# Patient Record
Sex: MALE | Race: WHITE | HISPANIC OR LATINO | Employment: FULL TIME | URBAN - METROPOLITAN AREA
[De-identification: names, ages, dates, MRNs, and addresses within clinical notes are randomized per-mention and may not be internally consistent; named-entity substitution may affect disease eponyms.]

---
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
## 2020-09-21 ENCOUNTER — TELEPHONE (OUTPATIENT)
Dept: SCHEDULING | Facility: IMAGING CENTER | Age: 20
End: 2020-09-21

## 2020-09-22 ENCOUNTER — OFFICE VISIT (OUTPATIENT)
Dept: MEDICAL GROUP | Facility: LAB | Age: 20
End: 2020-09-22
Payer: COMMERCIAL

## 2020-09-22 VITALS
OXYGEN SATURATION: 94 % | RESPIRATION RATE: 18 BRPM | TEMPERATURE: 98.4 F | WEIGHT: 174 LBS | HEART RATE: 80 BPM | BODY MASS INDEX: 23.57 KG/M2 | SYSTOLIC BLOOD PRESSURE: 102 MMHG | HEIGHT: 72 IN | DIASTOLIC BLOOD PRESSURE: 60 MMHG

## 2020-09-22 DIAGNOSIS — Z00.00 PREVENTATIVE HEALTH CARE: ICD-10-CM

## 2020-09-22 PROCEDURE — 99385 PREV VISIT NEW AGE 18-39: CPT | Performed by: PHYSICIAN ASSISTANT

## 2020-09-22 ASSESSMENT — ENCOUNTER SYMPTOMS
SORE THROAT: 0
EYES NEGATIVE: 1
GASTROINTESTINAL NEGATIVE: 1
DIAPHORESIS: 0
MUSCULOSKELETAL NEGATIVE: 1
RESPIRATORY NEGATIVE: 1
NEUROLOGICAL NEGATIVE: 1
WEIGHT LOSS: 0
CHILLS: 0
FEVER: 0
PSYCHIATRIC NEGATIVE: 1
CARDIOVASCULAR NEGATIVE: 1

## 2020-09-22 ASSESSMENT — PATIENT HEALTH QUESTIONNAIRE - PHQ9: CLINICAL INTERPRETATION OF PHQ2 SCORE: 0

## 2020-09-22 NOTE — PROGRESS NOTES
Real Porter is a 20 y.o. male new patient here for preventative visit, labs.       HPI:   Preventative health care  New patient today  Doing well  No acute concerns.   Due for Biometric screening for work.     Current medicines (including changes today)  Current Outpatient Medications   Medication Sig Dispense Refill   • Ibuprofen (MOTRIN PO) Take  by mouth as needed.       No current facility-administered medications for this visit.      He  has no past medical history of Allergy, Muscle disorder, or OSTEOPOROSIS.  He  has a past surgical history that includes acl reconstruction scope (11/6/2013); meniscus repair (11/6/2013); acl reconstruction scope (5/22/2014); meniscectomy (5/22/2014); and synovectomy (5/22/2014).  Social History     Tobacco Use   • Smoking status: Never Smoker   • Smokeless tobacco: Never Used   Substance Use Topics   • Alcohol use: No   • Drug use: No     Social History     Social History Narrative    Drive fork lift at work.         Exercises daily for about 60mins.     Well balanced diet.      Family History   Problem Relation Age of Onset   • Diabetes Other    • Hypertension Other      Family Status   Relation Name Status   • Mo  Alive   • Fa  Alive   • OTHER  (Not Specified)   • OTHER  (Not Specified)         ROS  Review of Systems   Constitutional: Negative for chills, diaphoresis, fever, malaise/fatigue and weight loss.   HENT: Negative.  Negative for congestion, hearing loss and sore throat.    Eyes: Negative.    Respiratory: Negative.    Cardiovascular: Negative.    Gastrointestinal: Negative.    Genitourinary: Negative.    Musculoskeletal: Negative.    Skin: Negative for itching and rash.   Neurological: Negative.    Endo/Heme/Allergies: Negative.  Negative for environmental allergies.   Psychiatric/Behavioral: Negative.         Objective:     /60   Pulse 80   Temp 36.9 °C (98.4 °F)   Resp 18   Ht 1.829 m (6')   Wt 78.9 kg (174 lb)   SpO2 94%  Body mass index is 23.6  kg/m².  Physical Exam:    Constitutional: Alert, no distress.  Skin: Warm, dry, good turgor, no rashes in visible areas.  Eye: Equal, round and reactive, conjunctiva clear, lids normal.  ENMT: Lips without lesions, good dentition, oropharynx clear. Neck: Trachea midline, no masses, no thyromegaly. No cervical or supraclavicular lymphadenopathy.  Respiratory: Unlabored respiratory effort, lungs clear to auscultation, no wheezes, no ronchi.  Cardiovascular: Normal S1, S2, no murmur, no edema.  Psych: Alert and oriented x3, normal affect and mood.        Assessment and Plan:   The following treatment plan was discussed    1. Preventative health care  CBC WITH DIFFERENTIAL    Comp Metabolic Panel    Lipid Profile    HEMOGLOBIN A1C    TSH WITH REFLEX TO FT4     Pt is doing well  No acute concerns  Due for labs - ordered today; pt to complete when fasting.   Will complete paperwork once labs have been reviewed.     Records requested.  Followup: Return if symptoms worsen or fail to improve.

## 2020-09-25 ENCOUNTER — TELEPHONE (OUTPATIENT)
Dept: MEDICAL GROUP | Facility: LAB | Age: 20
End: 2020-09-25

## 2020-09-25 LAB
ALBUMIN SERPL-MCNC: 4.9 G/DL (ref 4.1–5.2)
ALBUMIN/GLOB SERPL: 2 {RATIO} (ref 1.2–2.2)
ALP SERPL-CCNC: 89 IU/L (ref 39–117)
ALT SERPL-CCNC: 16 IU/L (ref 0–44)
AST SERPL-CCNC: 17 IU/L (ref 0–40)
BASOPHILS # BLD AUTO: 0 X10E3/UL (ref 0–0.2)
BASOPHILS NFR BLD AUTO: 1 %
BILIRUB SERPL-MCNC: 0.5 MG/DL (ref 0–1.2)
BUN SERPL-MCNC: 24 MG/DL (ref 6–20)
BUN/CREAT SERPL: 20 (ref 9–20)
CALCIUM SERPL-MCNC: 9.6 MG/DL (ref 8.7–10.2)
CHLORIDE SERPL-SCNC: 104 MMOL/L (ref 96–106)
CHOLEST SERPL-MCNC: 122 MG/DL (ref 100–199)
CO2 SERPL-SCNC: 23 MMOL/L (ref 20–29)
CREAT SERPL-MCNC: 1.18 MG/DL (ref 0.76–1.27)
EOSINOPHIL # BLD AUTO: 0.1 X10E3/UL (ref 0–0.4)
EOSINOPHIL NFR BLD AUTO: 1 %
ERYTHROCYTE [DISTWIDTH] IN BLOOD BY AUTOMATED COUNT: 13.4 % (ref 11.6–15.4)
GLOBULIN SER CALC-MCNC: 2.4 G/DL (ref 1.5–4.5)
GLUCOSE SERPL-MCNC: 98 MG/DL (ref 65–99)
HBA1C MFR BLD: 5.8 % (ref 4.8–5.6)
HCT VFR BLD AUTO: 46.3 % (ref 37.5–51)
HDLC SERPL-MCNC: 43 MG/DL
HGB BLD-MCNC: 15.6 G/DL (ref 13–17.7)
IMM GRANULOCYTES # BLD AUTO: 0 X10E3/UL (ref 0–0.1)
IMM GRANULOCYTES NFR BLD AUTO: 0 %
IMMATURE CELLS  115398: NORMAL
LABORATORY COMMENT REPORT: NORMAL
LDLC SERPL CALC-MCNC: 65 MG/DL (ref 0–99)
LYMPHOCYTES # BLD AUTO: 2.3 X10E3/UL (ref 0.7–3.1)
LYMPHOCYTES NFR BLD AUTO: 40 %
MCH RBC QN AUTO: 28.1 PG (ref 26.6–33)
MCHC RBC AUTO-ENTMCNC: 33.7 G/DL (ref 31.5–35.7)
MCV RBC AUTO: 83 FL (ref 79–97)
MONOCYTES # BLD AUTO: 0.4 X10E3/UL (ref 0.1–0.9)
MONOCYTES NFR BLD AUTO: 8 %
MORPHOLOGY BLD-IMP: NORMAL
NEUTROPHILS # BLD AUTO: 2.8 X10E3/UL (ref 1.4–7)
NEUTROPHILS NFR BLD AUTO: 50 %
NRBC BLD AUTO-RTO: NORMAL %
PLATELET # BLD AUTO: 207 X10E3/UL (ref 150–450)
POTASSIUM SERPL-SCNC: 4 MMOL/L (ref 3.5–5.2)
PROT SERPL-MCNC: 7.3 G/DL (ref 6–8.5)
RBC # BLD AUTO: 5.55 X10E6/UL (ref 4.14–5.8)
SODIUM SERPL-SCNC: 142 MMOL/L (ref 134–144)
TRIGL SERPL-MCNC: 69 MG/DL (ref 0–149)
TSH SERPL DL<=0.005 MIU/L-ACNC: 1.86 UIU/ML (ref 0.45–4.5)
VLDLC SERPL CALC-MCNC: 14 MG/DL (ref 5–40)
WBC # BLD AUTO: 5.6 X10E3/UL (ref 3.4–10.8)

## 2020-09-25 NOTE — TELEPHONE ENCOUNTER
1. Caller Name. Real Porter      How would the patient prefer to be contacted with a response:     Pt will pick his forms up from the office. [facility] forms have been scanned into media.

## 2020-09-28 ENCOUNTER — TELEPHONE (OUTPATIENT)
Dept: MEDICAL GROUP | Facility: LAB | Age: 20
End: 2020-09-28

## 2020-09-28 NOTE — TELEPHONE ENCOUNTER
----- Message from Janet Koehler P.A.-C. sent at 9/27/2020  5:33 PM PDT -----  Hi team,     Please let Real know that his labs look good.     Only thing I see is The A1c, which measures your blood sugar over 3 months, is slightly elevated to a prediabetic range - please reduce and monitor your sugar/carbohydrate intake - less bread, pasta, rice, candy, soda/sweets, etc.    Let me know if you have any questions or concerns.   Janet Koehler P.A.-C.

## 2021-05-11 ENCOUNTER — OFFICE VISIT (OUTPATIENT)
Dept: URGENT CARE | Facility: PHYSICIAN GROUP | Age: 21
End: 2021-05-11
Payer: COMMERCIAL

## 2021-05-11 VITALS
SYSTOLIC BLOOD PRESSURE: 112 MMHG | DIASTOLIC BLOOD PRESSURE: 72 MMHG | RESPIRATION RATE: 12 BRPM | BODY MASS INDEX: 23.8 KG/M2 | HEIGHT: 71 IN | WEIGHT: 170 LBS | TEMPERATURE: 98.6 F | OXYGEN SATURATION: 98 % | HEART RATE: 65 BPM

## 2021-05-11 DIAGNOSIS — J30.2 SEASONAL ALLERGIC RHINITIS, UNSPECIFIED TRIGGER: ICD-10-CM

## 2021-05-11 PROCEDURE — 99213 OFFICE O/P EST LOW 20 MIN: CPT | Performed by: NURSE PRACTITIONER

## 2021-05-11 ASSESSMENT — ENCOUNTER SYMPTOMS
SHORTNESS OF BREATH: 0
CARDIOVASCULAR NEGATIVE: 1
RESPIRATORY NEGATIVE: 1
FEVER: 0
CHILLS: 0
CONSTITUTIONAL NEGATIVE: 1
COUGH: 0
SORE THROAT: 0

## 2021-05-11 ASSESSMENT — VISUAL ACUITY: OU: 1

## 2021-05-11 ASSESSMENT — FIBROSIS 4 INDEX: FIB4 SCORE: 0.45

## 2021-05-11 NOTE — PATIENT INSTRUCTIONS
Allergic Rhinitis, Adult  Allergic rhinitis is an allergic reaction that affects the mucous membrane inside the nose. It causes sneezing, a runny or stuffy nose, and the feeling of mucus going down the back of the throat (postnasal drip). Allergic rhinitis can be mild to severe.  There are two types of allergic rhinitis:  · Seasonal. This type is also called hay fever. It happens only during certain seasons.  · Perennial. This type can happen at any time of the year.  What are the causes?  This condition happens when the body's defense system (immune system) responds to certain harmless substances called allergens as though they were germs.   Seasonal allergic rhinitis is triggered by pollen, which can come from grasses, trees, and weeds. Perennial allergic rhinitis may be caused by:  · House dust mites.  · Pet dander.  · Mold spores.  What are the signs or symptoms?  Symptoms of this condition include:  · Sneezing.  · Runny or stuffy nose (nasal congestion).  · Postnasal drip.  · Itchy nose.  · Tearing of the eyes.  · Trouble sleeping.  · Daytime sleepiness.  How is this diagnosed?  This condition may be diagnosed based on:  · Your medical history.  · A physical exam.  · Tests to check for related conditions, such as:  ? Asthma.  ? Pink eye.  ? Ear infection.  ? Upper respiratory infection.  · Tests to find out which allergens trigger your symptoms. These may include skin or blood tests.  How is this treated?  There is no cure for this condition, but treatment can help control symptoms. Treatment may include:  · Taking medicines that block allergy symptoms, such as antihistamines. Medicine may be given as a shot, nasal spray, or pill.  · Avoiding the allergen.  · Desensitization. This treatment involves getting ongoing shots until your body becomes less sensitive to the allergen. This treatment may be done if other treatments do not help.  · If taking medicine and avoiding the allergen does not work, new, stronger  medicines may be prescribed.  Follow these instructions at home:  · Find out what you are allergic to. Common allergens include smoke, dust, and pollen.  · Avoid the things you are allergic to. These are some things you can do to help avoid allergens:  ? Replace carpet with wood, tile, or vinyl toby. Carpet can trap dander and dust.  ? Do not smoke. Do not allow smoking in your home.  ? Change your heating and air conditioning filter at least once a month.  ? During allergy season:  § Keep windows closed as much as possible.  § Plan outdoor activities when pollen counts are lowest. This is usually during the evening hours.  § When coming indoors, change clothing and shower before sitting on furniture or bedding.  · Take over-the-counter and prescription medicines only as told by your health care provider.  · Keep all follow-up visits as told by your health care provider. This is important.  Contact a health care provider if:  · You have a fever.  · You develop a persistent cough.  · You make whistling sounds when you breathe (you wheeze).  · Your symptoms interfere with your normal daily activities.  Get help right away if:  · You have shortness of breath.  Summary  · This condition can be managed by taking medicines as directed and avoiding allergens.  · Contact your health care provider if you develop a persistent cough or fever.  · During allergy season, keep windows closed as much as possible.  This information is not intended to replace advice given to you by your health care provider. Make sure you discuss any questions you have with your health care provider.  Document Released: 09/12/2002 Document Revised: 11/30/2018 Document Reviewed: 01/25/2018  [employer] Patient Education © 2020 [employer]      May use any combination of the following over-the-counter medications per 's instructions:  - sinus rinse kits/nasal saline sprays  - nasal steroid sprays (e.g. Flonase, Nasacort)  - oral daily  antihistamine (e.g. Claritin, Zyrtec, Allegra)

## 2021-05-11 NOTE — LETTER
May 11, 2021         Patient: Real Porter   YOB: 2000   Date of Visit: 5/11/2021           To Whom it May Concern:     Real Porter was seen in my clinic on 5/11/2021. He was excluded from work due to symptoms.    In my medical opinion, patient has symptoms of chronic environmental allergies and not of a communicable disease. He is medically cleared to return to normal work duties.    Please excuse patient from his work absences on 5/7, 5/10, and 5/11.    If you have any questions or concerns, please don't hesitate to call.        Sincerely,         TIA Henriquez.  Electronically Signed

## 2021-05-11 NOTE — PROGRESS NOTES
Subjective:     Real Porter is a 21 y.o. male who presents for Nasal Congestion (congestion, sneezing, watery/itchy eyes, no fever,)       Other  This is a new problem. The problem has been unchanged. Associated symptoms include congestion. Pertinent negatives include no chills, coughing, fever or sore throat. Treatments tried: Nasal sprays. The treatment provided no relief.     Patient reports that for the past 5 days, he had symptoms of runny nose with clear discharge, sneezing, and watery/itchy eyes.  Denies respiratory symptoms.  No cough, shortness of breath, or fever.  Patient reports a history of environmental allergies with similar symptoms.  Denies sick contacts or recent travel.  Reports that he was excluded from work because of his symptoms and comes in to be evaluated and cleared to return to work.    Patient was screened prior to rooming and denied COVID-19 diagnosis or contact with a person who has been diagnosed or is suspected to have COVID-19. During this visit, appropriate PPE was worn, hand hygiene was performed, and the patient and any visitors were masked.     PMH:  has no past medical history of Allergy, Muscle disorder, or OSTEOPOROSIS.    MEDS:   Current Outpatient Medications:   •  Ibuprofen (MOTRIN PO), Take  by mouth as needed., Disp: , Rfl:     ALLERGIES: No Known Allergies    SURGHX:   Past Surgical History:   Procedure Laterality Date   • ACL RECONSTRUCTION SCOPE  5/22/2014    Performed by Marlon Gaytan M.D. at [facility]   • MENISCECTOMY  5/22/2014    Performed by Marlon Gaytan M.D. at [facility]   • SYNOVECTOMY  5/22/2014    Performed by Marlon Gaytan M.D. at [facility]   • ACL RECONSTRUCTION SCOPE  11/6/2013    Performed by Marlon Gaytan M.D. at [facility]   • MENISCUS REPAIR  11/6/2013    Performed by Marlon Gaytan M.D. at [facility]     SOCHX:  reports that he has never smoked. He has never used smokeless tobacco. He  "reports that he does not drink alcohol and does not use drugs.     FH: Reviewed with patient, not pertinent to this visit.    Review of Systems   Constitutional: Negative.  Negative for chills, fever and malaise/fatigue.   HENT: Positive for congestion. Negative for sore throat.         Runny nose, clear discharge, sneezing   Eyes:        Watery, itchy eyes   Respiratory: Negative.  Negative for cough and shortness of breath.    Cardiovascular: Negative.    Endo/Heme/Allergies: Positive for environmental allergies.   All other systems reviewed and are negative.    Additional details per HPI.      Objective:     /72 (BP Location: Right arm, Patient Position: Sitting, BP Cuff Size: Adult)   Pulse 65   Temp 37 °C (98.6 °F) (Temporal)   Resp 12   Ht 1.803 m (5' 11\")   Wt 77.1 kg (170 lb)   SpO2 98%   BMI 23.71 kg/m²     Physical Exam  Vitals reviewed.   Constitutional:       General: He is not in acute distress.     Appearance: He is well-developed. He is not ill-appearing or toxic-appearing.   HENT:      Head: Normocephalic.      Right Ear: External ear normal.      Left Ear: External ear normal.      Nose: Nose normal.      Mouth/Throat:      Mouth: Mucous membranes are moist.      Pharynx: Oropharynx is clear. Uvula midline.   Eyes:      General: Vision grossly intact.         Right eye: No discharge.         Left eye: No discharge.      Extraocular Movements: Extraocular movements intact.      Conjunctiva/sclera: Conjunctivae normal.      Right eye: Right conjunctiva is not injected.      Left eye: Left conjunctiva is not injected.      Pupils: Pupils are equal, round, and reactive to light.   Cardiovascular:      Rate and Rhythm: Normal rate and regular rhythm.      Pulses: Normal pulses.      Heart sounds: Normal heart sounds.   Pulmonary:      Effort: Pulmonary effort is normal. No respiratory distress.      Breath sounds: Normal breath sounds. No decreased breath sounds, wheezing, rhonchi or rales. "   Musculoskeletal:         General: No deformity. Normal range of motion.      Cervical back: Normal range of motion.   Skin:     General: Skin is warm and dry.      Capillary Refill: Capillary refill takes less than 2 seconds.      Coloration: Skin is not pale.   Neurological:      Mental Status: He is alert and oriented to person, place, and time.      Sensory: No sensory deficit.      Motor: No weakness.      Coordination: Coordination normal.   Psychiatric:         Behavior: Behavior normal. Behavior is cooperative.       Assessment/Plan:     1. Seasonal allergic rhinitis, unspecified trigger    May use any combination of the following over-the-counter medications per 's instructions:  - sinus rinse kits/nasal saline sprays  - nasal steroid sprays (e.g. Flonase, Nasacort)  - oral daily antihistamine (e.g. Claritin, Zyrtec, Allegra)    Work note provided.    Differential diagnosis, natural history, supportive care, over-the-counter symptom management per 's instructions, close monitoring, and indications for immediate follow-up discussed.     Patient advised to: Return for 1) Symptoms that worsen/don't improve, or go to ER, 2) Follow up with primary care in 7-10 days.    All questions answered. Patient agrees with the plan of care.    Discharge summary provided.

## 2021-09-30 ENCOUNTER — HOSPITAL ENCOUNTER (OUTPATIENT)
Dept: LAB | Facility: MEDICAL CENTER | Age: 21
End: 2021-09-30
Attending: PHYSICIAN ASSISTANT
Payer: COMMERCIAL

## 2021-09-30 ENCOUNTER — OFFICE VISIT (OUTPATIENT)
Dept: MEDICAL GROUP | Facility: LAB | Age: 21
End: 2021-09-30
Payer: COMMERCIAL

## 2021-09-30 VITALS
DIASTOLIC BLOOD PRESSURE: 78 MMHG | RESPIRATION RATE: 14 BRPM | HEIGHT: 71 IN | OXYGEN SATURATION: 97 % | TEMPERATURE: 98.3 F | SYSTOLIC BLOOD PRESSURE: 110 MMHG | BODY MASS INDEX: 24.95 KG/M2 | WEIGHT: 178.2 LBS | HEART RATE: 55 BPM

## 2021-09-30 DIAGNOSIS — R73.03 PREDIABETES: ICD-10-CM

## 2021-09-30 DIAGNOSIS — Z00.00 PREVENTATIVE HEALTH CARE: Primary | ICD-10-CM

## 2021-09-30 DIAGNOSIS — Z00.00 PREVENTATIVE HEALTH CARE: ICD-10-CM

## 2021-09-30 LAB
ALBUMIN SERPL BCP-MCNC: 4.8 G/DL (ref 3.2–4.9)
ALBUMIN/GLOB SERPL: 1.7 G/DL
ALP SERPL-CCNC: 91 U/L (ref 30–99)
ALT SERPL-CCNC: 27 U/L (ref 2–50)
ANION GAP SERPL CALC-SCNC: 8 MMOL/L (ref 7–16)
AST SERPL-CCNC: 16 U/L (ref 12–45)
BILIRUB SERPL-MCNC: 0.3 MG/DL (ref 0.1–1.5)
BUN SERPL-MCNC: 24 MG/DL (ref 8–22)
CALCIUM SERPL-MCNC: 9.8 MG/DL (ref 8.5–10.5)
CHLORIDE SERPL-SCNC: 105 MMOL/L (ref 96–112)
CHOLEST SERPL-MCNC: 145 MG/DL (ref 100–199)
CO2 SERPL-SCNC: 24 MMOL/L (ref 20–33)
CREAT SERPL-MCNC: 1.1 MG/DL (ref 0.5–1.4)
EST. AVERAGE GLUCOSE BLD GHB EST-MCNC: 123 MG/DL
FASTING STATUS PATIENT QL REPORTED: NORMAL
GLOBULIN SER CALC-MCNC: 2.8 G/DL (ref 1.9–3.5)
GLUCOSE SERPL-MCNC: 100 MG/DL (ref 65–99)
HBA1C MFR BLD: 5.9 % (ref 4–5.6)
HDLC SERPL-MCNC: 45 MG/DL
LDLC SERPL CALC-MCNC: 78 MG/DL
POTASSIUM SERPL-SCNC: 4.2 MMOL/L (ref 3.6–5.5)
PROT SERPL-MCNC: 7.6 G/DL (ref 6–8.2)
SODIUM SERPL-SCNC: 137 MMOL/L (ref 135–145)
TRIGL SERPL-MCNC: 112 MG/DL (ref 0–149)

## 2021-09-30 PROCEDURE — 80053 COMPREHEN METABOLIC PANEL: CPT

## 2021-09-30 PROCEDURE — 83036 HEMOGLOBIN GLYCOSYLATED A1C: CPT

## 2021-09-30 PROCEDURE — 80061 LIPID PANEL: CPT

## 2021-09-30 PROCEDURE — 36415 COLL VENOUS BLD VENIPUNCTURE: CPT

## 2021-09-30 PROCEDURE — 99395 PREV VISIT EST AGE 18-39: CPT | Performed by: PHYSICIAN ASSISTANT

## 2021-09-30 ASSESSMENT — PATIENT HEALTH QUESTIONNAIRE - PHQ9: CLINICAL INTERPRETATION OF PHQ2 SCORE: 0

## 2021-09-30 ASSESSMENT — FIBROSIS 4 INDEX: FIB4 SCORE: 0.45

## 2021-09-30 NOTE — PROGRESS NOTES
Real Porter Jr. is a 21 y.o. male  patient here for preventive exam.     HPI:    Preventative health care  Patient presents today for annual physical.  Doing well.  No acute concerns.  He does have a form that needs to be completed for his insurance requires him to have fasting blood work today.    Current medicines (including changes today)  No current outpatient medications on file.     No current facility-administered medications for this visit.     He  has no past medical history of Allergy, Muscle disorder, or OSTEOPOROSIS.  He  has a past surgical history that includes acl reconstruction scope (11/6/2013); meniscus repair (11/6/2013); acl reconstruction scope (5/22/2014); meniscectomy (5/22/2014); and synovectomy (5/22/2014).  Social History     Tobacco Use   • Smoking status: Never Smoker   • Smokeless tobacco: Never Used   Vaping Use   • Vaping Use: Never used   Substance Use Topics   • Alcohol use: No   • Drug use: No     Social History     Social History Narrative    Drive fork lift at work.         Exercises daily for about 60mins.     Well balanced diet.      Family History   Problem Relation Age of Onset   • Diabetes Other    • Hypertension Other      Family Status   Relation Name Status   • Mo  Alive   • Fa  Alive   • OTHER  (Not Specified)   • OTHER  (Not Specified)         ROS  Constitutional: Negative for fever, chills and malaise/fatigue.   HENT: Negative for congestion.    Eyes: Negative for pain.   Respiratory: Negative for cough and shortness of breath.    Cardiovascular: Negative for leg swelling.   Gastrointestinal: Negative for nausea, vomiting, abdominal pain and diarrhea.   Genitourinary: Negative for dysuria and hematuria.   Skin: Negative for rash.   Neurological: Negative for dizziness, focal weakness and headaches.   Endo/Heme/Allergies: Does not bruise/bleed easily.   Psychiatric/Behavioral: Negative for depression.  The patient is not nervous/anxious.    All other systems  "reviewed and are negative     Objective:     /78 (BP Location: Left arm, Patient Position: Sitting, BP Cuff Size: Adult)   Pulse (!) 55   Temp 36.8 °C (98.3 °F) (Temporal)   Resp 14   Ht 1.803 m (5' 11\")   Wt 80.8 kg (178 lb 3.2 oz)   SpO2 97%  Body mass index is 24.85 kg/m².  Physical Exam:    Constitutional: Alert, no distress.  Skin: Warm, dry, good turgor, no rashes in visible areas.  Eye: Equal, round and reactive, conjunctiva clear, lids normal.  ENMT: Lips without lesions, good dentition, oropharynx clear. TMs pearly gray bilaterally.  Neck: Trachea midline, no masses, no thyromegaly. No cervical or supraclavicular lymphadenopathy.  Respiratory: Unlabored respiratory effort, lungs clear to auscultation, no wheezes, no ronchi.  Cardiovascular: Normal S1, S2, no murmur, no edema.  Psych: Alert and oriented x3, normal affect and mood.        Assessment and Plan:   The following treatment plan was discussed    1. Prediabetes  We advise to reduce sugar/carbohydrate/alcohol, eat more vegetables and lean meats such as fish/chicken/turkey. We also recommend 30 minutes of cardiovascular exercise most days of the week.  - Comp Metabolic Panel; Future  - Lipid Profile; Future  - HEMOGLOBIN A1C; Future    2. Preventative health care  Annual physical completed today.  Exam is within normal limits.  He is fasting today and is able to complete blood work for form is required for insurance/work.  Follow-up in 1 year, of course sooner if needed  - Comp Metabolic Panel; Future  - Lipid Profile; Future  - HEMOGLOBIN A1C; Future    Patient declined vaccinations today.    Followup: Return in about 1 year (around 9/30/2022), or if symptoms worsen or fail to improve.       Janet Koehler P.A.-C.  Supervising MD: Dr. Darryl Tam MD  09/30/21      "

## 2021-09-30 NOTE — ASSESSMENT & PLAN NOTE
Patient presents today for annual physical.  Doing well.  No acute concerns.  He does have a form that needs to be completed for his insurance requires him to have fasting blood work today.

## 2022-09-09 ENCOUNTER — OFFICE VISIT (OUTPATIENT)
Dept: MEDICAL GROUP | Facility: LAB | Age: 22
End: 2022-09-09
Payer: COMMERCIAL

## 2022-09-09 ENCOUNTER — HOSPITAL ENCOUNTER (OUTPATIENT)
Dept: LAB | Facility: MEDICAL CENTER | Age: 22
End: 2022-09-09
Attending: PHYSICIAN ASSISTANT
Payer: COMMERCIAL

## 2022-09-09 VITALS
RESPIRATION RATE: 16 BRPM | DIASTOLIC BLOOD PRESSURE: 76 MMHG | BODY MASS INDEX: 27.58 KG/M2 | SYSTOLIC BLOOD PRESSURE: 102 MMHG | WEIGHT: 197 LBS | OXYGEN SATURATION: 97 % | HEIGHT: 71 IN | HEART RATE: 68 BPM | TEMPERATURE: 97.2 F

## 2022-09-09 DIAGNOSIS — Z00.00 PREVENTATIVE HEALTH CARE: ICD-10-CM

## 2022-09-09 DIAGNOSIS — Z00.00 PREVENTATIVE HEALTH CARE: Primary | ICD-10-CM

## 2022-09-09 LAB
ALBUMIN SERPL BCP-MCNC: 4.7 G/DL (ref 3.2–4.9)
ALBUMIN/GLOB SERPL: 1.7 G/DL
ALP SERPL-CCNC: 90 U/L (ref 30–99)
ALT SERPL-CCNC: 17 U/L (ref 2–50)
ANION GAP SERPL CALC-SCNC: 10 MMOL/L (ref 7–16)
AST SERPL-CCNC: 16 U/L (ref 12–45)
BASOPHILS # BLD AUTO: 0.8 % (ref 0–1.8)
BASOPHILS # BLD: 0.05 K/UL (ref 0–0.12)
BILIRUB SERPL-MCNC: 0.3 MG/DL (ref 0.1–1.5)
BUN SERPL-MCNC: 21 MG/DL (ref 8–22)
CALCIUM SERPL-MCNC: 9.3 MG/DL (ref 8.5–10.5)
CHLORIDE SERPL-SCNC: 105 MMOL/L (ref 96–112)
CHOLEST SERPL-MCNC: 168 MG/DL (ref 100–199)
CO2 SERPL-SCNC: 25 MMOL/L (ref 20–33)
CREAT SERPL-MCNC: 0.98 MG/DL (ref 0.5–1.4)
EOSINOPHIL # BLD AUTO: 0.06 K/UL (ref 0–0.51)
EOSINOPHIL NFR BLD: 0.9 % (ref 0–6.9)
ERYTHROCYTE [DISTWIDTH] IN BLOOD BY AUTOMATED COUNT: 43.2 FL (ref 35.9–50)
EST. AVERAGE GLUCOSE BLD GHB EST-MCNC: 117 MG/DL
GFR SERPLBLD CREATININE-BSD FMLA CKD-EPI: 111 ML/MIN/1.73 M 2
GLOBULIN SER CALC-MCNC: 2.7 G/DL (ref 1.9–3.5)
GLUCOSE SERPL-MCNC: 108 MG/DL (ref 65–99)
HBA1C MFR BLD: 5.7 % (ref 4–5.6)
HCT VFR BLD AUTO: 48.8 % (ref 42–52)
HDLC SERPL-MCNC: 51 MG/DL
HGB BLD-MCNC: 16.5 G/DL (ref 14–18)
IMM GRANULOCYTES # BLD AUTO: 0.03 K/UL (ref 0–0.11)
IMM GRANULOCYTES NFR BLD AUTO: 0.5 % (ref 0–0.9)
LDLC SERPL CALC-MCNC: 99 MG/DL
LYMPHOCYTES # BLD AUTO: 2.26 K/UL (ref 1–4.8)
LYMPHOCYTES NFR BLD: 34.2 % (ref 22–41)
MCH RBC QN AUTO: 28.4 PG (ref 27–33)
MCHC RBC AUTO-ENTMCNC: 33.8 G/DL (ref 33.7–35.3)
MCV RBC AUTO: 84.1 FL (ref 81.4–97.8)
MONOCYTES # BLD AUTO: 0.43 K/UL (ref 0–0.85)
MONOCYTES NFR BLD AUTO: 6.5 % (ref 0–13.4)
NEUTROPHILS # BLD AUTO: 3.78 K/UL (ref 1.82–7.42)
NEUTROPHILS NFR BLD: 57.1 % (ref 44–72)
NRBC # BLD AUTO: 0 K/UL
NRBC BLD-RTO: 0 /100 WBC
PLATELET # BLD AUTO: 186 K/UL (ref 164–446)
PMV BLD AUTO: 11.7 FL (ref 9–12.9)
POTASSIUM SERPL-SCNC: 3.8 MMOL/L (ref 3.6–5.5)
PROT SERPL-MCNC: 7.4 G/DL (ref 6–8.2)
RBC # BLD AUTO: 5.8 M/UL (ref 4.7–6.1)
SODIUM SERPL-SCNC: 140 MMOL/L (ref 135–145)
TRIGL SERPL-MCNC: 91 MG/DL (ref 0–149)
TSH SERPL DL<=0.005 MIU/L-ACNC: 1.35 UIU/ML (ref 0.38–5.33)
WBC # BLD AUTO: 6.6 K/UL (ref 4.8–10.8)

## 2022-09-09 PROCEDURE — 84443 ASSAY THYROID STIM HORMONE: CPT

## 2022-09-09 PROCEDURE — 99395 PREV VISIT EST AGE 18-39: CPT | Performed by: PHYSICIAN ASSISTANT

## 2022-09-09 PROCEDURE — 80061 LIPID PANEL: CPT

## 2022-09-09 PROCEDURE — 36415 COLL VENOUS BLD VENIPUNCTURE: CPT

## 2022-09-09 PROCEDURE — 83036 HEMOGLOBIN GLYCOSYLATED A1C: CPT

## 2022-09-09 PROCEDURE — 80053 COMPREHEN METABOLIC PANEL: CPT

## 2022-09-09 PROCEDURE — 85025 COMPLETE CBC W/AUTO DIFF WBC: CPT

## 2022-09-09 ASSESSMENT — FIBROSIS 4 INDEX: FIB4 SCORE: 0.34

## 2022-09-09 NOTE — PROGRESS NOTES
"Subjective:   CC: Real Porter Jr. is a 22 y.o. male here today for Annual physical     HPI:  Preventative health care  Follow up, doing well.   Requesting labs.      Current medicines (including changes today)  No current outpatient medications on file.     No current facility-administered medications for this visit.     He  has no past medical history of Allergy, Muscle disorder, or OSTEOPOROSIS.    ROS   No chest pain, no shortness of breath, no abdominal pain       Objective:     /76 (BP Location: Left arm, Patient Position: Sitting, BP Cuff Size: Adult)   Pulse 68   Temp 36.2 °C (97.2 °F) (Temporal)   Resp 16   Ht 1.803 m (5' 11\")   Wt 89.4 kg (197 lb)   SpO2 97%  Body mass index is 27.48 kg/m².     Physical Exam:  Constitutional: Alert, no distress.  Skin: Warm, dry, good turgor, no rashes in visible areas.  Eye: Equal, round, conjunctiva clear, lids normal.  Neck: Trachea midline, no masses, no thyromegaly. No cervical or supraclavicular lymphadenopathy  Respiratory: Unlabored respiratory effort, lungs clear to auscultation, no wheezes, no ronchi.  Cardiovascular: Normal S1, S2, no murmur, no edema.  Psych: Alert and oriented x3, normal affect and mood.    Assessment and Plan:   The following treatment plan was discussed    1. Preventative health care  Doing well, no acute concern.   Due for labs.   Has a form for health insurance through his work that will be completed once labs have been reviewed.   - CBC WITH DIFFERENTIAL; Future  - Comp Metabolic Panel; Future  - Lipid Profile; Future  - HEMOGLOBIN A1C; Future  - TSH WITH REFLEX TO FT4; Future      Followup: Return in about 1 year (around 9/9/2023), or if symptoms worsen or fail to improve.       Janet Koehler P.A.-C.  Supervising MD: Dr. Darryl Tam MD  09/09/22      "

## 2023-07-26 ENCOUNTER — OFFICE VISIT (OUTPATIENT)
Dept: URGENT CARE | Facility: CLINIC | Age: 23
End: 2023-07-26
Payer: COMMERCIAL

## 2023-07-26 VITALS
WEIGHT: 195 LBS | OXYGEN SATURATION: 98 % | DIASTOLIC BLOOD PRESSURE: 60 MMHG | HEIGHT: 71 IN | SYSTOLIC BLOOD PRESSURE: 110 MMHG | TEMPERATURE: 97.8 F | HEART RATE: 82 BPM | RESPIRATION RATE: 18 BRPM | BODY MASS INDEX: 27.3 KG/M2

## 2023-07-26 DIAGNOSIS — R11.2 NAUSEA AND VOMITING, UNSPECIFIED VOMITING TYPE: ICD-10-CM

## 2023-07-26 DIAGNOSIS — S61.217A LACERATION OF LEFT LITTLE FINGER WITHOUT FOREIGN BODY WITHOUT DAMAGE TO NAIL, INITIAL ENCOUNTER: ICD-10-CM

## 2023-07-26 PROCEDURE — 3074F SYST BP LT 130 MM HG: CPT | Performed by: NURSE PRACTITIONER

## 2023-07-26 PROCEDURE — 12002 RPR S/N/AX/GEN/TRNK2.6-7.5CM: CPT | Performed by: NURSE PRACTITIONER

## 2023-07-26 PROCEDURE — 3078F DIAST BP <80 MM HG: CPT | Performed by: NURSE PRACTITIONER

## 2023-07-26 RX ORDER — ONDANSETRON 4 MG/1
4 TABLET, ORALLY DISINTEGRATING ORAL ONCE
Status: COMPLETED | OUTPATIENT
Start: 2023-07-26 | End: 2023-07-26

## 2023-07-26 RX ADMIN — ONDANSETRON 4 MG: 4 TABLET, ORALLY DISINTEGRATING ORAL at 14:18

## 2023-07-26 ASSESSMENT — FIBROSIS 4 INDEX: FIB4 SCORE: 0.5

## 2023-07-26 NOTE — PROGRESS NOTES
Patient has consented to treatment and for use of patient information for treatment and billing purposes.    Date: 07/26/23     Arrival Mode: Private Vehicle    Chief Complaint:    Chief Complaint   Patient presents with    Laceration     While he was trying to cut foot x today         History of Present Illness: 23 y.o.  male presents to clinic with a laceration to his left pinky that he obtained while using a .  This happened approximately 30 minutes ago.  Denies any numbness or tingling or decreased range of motion.  He  denies that he was forceful with the cutting or any type of crush injury.      ROS:    As stated in HPI     Pertinent Medical History:  History reviewed. No pertinent past medical history.     Pertinent Surgical History:  Past Surgical History:   Procedure Laterality Date    RECONSTRUCTION, KNEE, ACL, ARTHROSCOPIC  5/22/2014    Performed by Marlon Gaytan M.D. at [facility]    MENISCECTOMY  5/22/2014    Performed by Marlon Gaytan M.D. at [facility]    SYNOVECTOMY  5/22/2014    Performed by Marlon Gaytan M.D. at [facility]    RECONSTRUCTION, KNEE, ACL, ARTHROSCOPIC  11/6/2013    Performed by Marlon Gaytan M.D. at [facility]    MENISCUS REPAIR  11/6/2013    Performed by Marlon Gaytan M.D. at [facility]        Pertinent Medications:    No current outpatient medications on file prior to visit.     No current facility-administered medications on file prior to visit.        Allergies:    Patient has no known allergies.     Social History:  Social History     Tobacco Use    Smoking status: Never    Smokeless tobacco: Never   Vaping Use    Vaping Use: Never used   Substance Use Topics    Alcohol use: No    Drug use: No        No LMP for male patient.           Physical Exam:    Vitals:    07/26/23 1230   BP: 110/60   Pulse: 82   Resp: 18   Temp: 36.6 °C (97.8 °F)   SpO2: 98%             Physical Exam  Constitutional:        Appearance: Normal appearance.   Musculoskeletal:        Hands:       Comments: Cap refill intact less than 3 seconds.  Range of motion intact flexor and extensor tendons intact.  No underlying bony tenderness noted.   Neurological:      Mental Status: He is alert.          LACERATION PROCEDURE NOTE:    Procedure explained and verbal consent obtained. Digital block t  The wound was anesthetized with  5 mL of  1% lidocaine, with good anesthesia. Sterile drape and prep were done. Copious irrigation was done with saline and the wound was explored. Length of wound was visualized at approximately: total 7cm . There was no visualized foreign body or deep structure injury noted. Wound was examined under range of motion, range of motion intact. Wound edges were approximated with good alignment using sutures. 15 sutures were placed using 5-0.  Nonstick dressing applied. Patient tolerated procedure well with no complications.     Medical Decision making and clinic course :  I personally reviewed prior external notes and test results pertinent to today's visit. Pt is clinically stable at today's acute urgent care visit.  No acute distress noted. Appropriate for outpatient care at this time. Shared decision-making was utilized with patient for treatment plan. UTD on Tdap     Pleasant nontoxic-appearing 23-year-old male presenting clinic with an acute laceration of his left fifth finger.  Fortunately exam findings reassuring flexor and extensor tendons intact no suspicion of bony trauma.  Patient did become wheezy and did begin vomiting prior to laceration closure.  Patient was given Zofran.  Patient was then laid down on the exam table with feet elevated.  This is the position he remained in during laceration closure and he did tolerate well.  Discussed appropriate wound care techniques as well as signs and symptoms of infection and when to return to clinic.  No indication of prophylactic antibiotics at this time.        The  patient remained stable during the urgent care visit.    Plan:    Medication discussed included indication for use and the potential  benefits and side effects.     1. Laceration of left little finger without foreign body without damage to nail, initial encounter      2. Nausea and vomiting, unspecified vomiting type    - ondansetron (Zofran ODT) dispertab 4 mg      All of the patient's questions were answered to their satisfaction at the time of discharge.    Follow up:    Recommended f/u in  7- 10 days  for removal     Patient was encouraged to monitor symptoms closely. Those signs and symptoms which would warrant concern and mandate seeking a higher level of service through the emergency department discussed at length and included in discharge papers.  Patient stated agreement and understanding of this plan of care.    Disposition:  Home in stable condition       Voice Recognition Disclaimer:  Portions of this document were created using voice recognition software. The software does have a chance of producing errors of grammar and possibly content. I have made every reasonable attempt to correct obvious errors, but there may be errors of grammar and possibly content that I did not discover before finalizing the documentation.    Shanta Mendoza, [email or web address].

## 2023-07-26 NOTE — LETTER
July 26, 2023    To Whom It May Concern:         This is confirmation that Real Porter Jr. attended his scheduled appointment with BRENDAN Agrawal on 7/26/23. Please excuse from work 7/27/23. May return 7/28//23        Sincerely,          TIA Agrawal.  [phone]

## 2023-08-05 ENCOUNTER — OFFICE VISIT (OUTPATIENT)
Dept: URGENT CARE | Facility: CLINIC | Age: 23
End: 2023-08-05
Payer: COMMERCIAL

## 2023-08-05 VITALS
RESPIRATION RATE: 16 BRPM | HEART RATE: 95 BPM | OXYGEN SATURATION: 97 % | SYSTOLIC BLOOD PRESSURE: 130 MMHG | BODY MASS INDEX: 27.3 KG/M2 | WEIGHT: 195 LBS | HEIGHT: 71 IN | TEMPERATURE: 96.6 F | DIASTOLIC BLOOD PRESSURE: 80 MMHG

## 2023-08-05 DIAGNOSIS — Z48.02 ENCOUNTER FOR REMOVAL OF SUTURES: ICD-10-CM

## 2023-08-05 PROCEDURE — 3075F SYST BP GE 130 - 139MM HG: CPT | Performed by: PHYSICIAN ASSISTANT

## 2023-08-05 PROCEDURE — 99212 OFFICE O/P EST SF 10 MIN: CPT | Mod: 25 | Performed by: PHYSICIAN ASSISTANT

## 2023-08-05 PROCEDURE — 15853 REMOVAL SUTR/STAPL XREQ ANES: CPT | Performed by: PHYSICIAN ASSISTANT

## 2023-08-05 PROCEDURE — 3079F DIAST BP 80-89 MM HG: CPT | Performed by: PHYSICIAN ASSISTANT

## 2023-08-05 ASSESSMENT — ENCOUNTER SYMPTOMS
SORE THROAT: 0
MYALGIAS: 0
CONSTIPATION: 0
NAUSEA: 0
ABDOMINAL PAIN: 0
CHILLS: 0
EYE PAIN: 0
DIARRHEA: 0
FEVER: 0
HEADACHES: 0
SHORTNESS OF BREATH: 0
COUGH: 0
VOMITING: 0

## 2023-08-05 ASSESSMENT — FIBROSIS 4 INDEX: FIB4 SCORE: 0.5

## 2023-08-05 NOTE — PROGRESS NOTES
"Subjective:   Real Porter Jr. is a 23 y.o. male who presents for Suture / Staple Removal      23-year-old male presents for removal of 15 sutures placed on 726 left ring finger    Review of Systems   Constitutional:  Negative for chills and fever.   HENT:  Negative for congestion, ear pain and sore throat.    Eyes:  Negative for pain.   Respiratory:  Negative for cough and shortness of breath.    Cardiovascular:  Negative for chest pain.   Gastrointestinal:  Negative for abdominal pain, constipation, diarrhea, nausea and vomiting.   Genitourinary:  Negative for dysuria.   Musculoskeletal:  Negative for myalgias.   Skin:  Negative for rash.   Neurological:  Negative for headaches.       Medications, Allergies, and current problem list reviewed today in Epic.     Objective:     /80 (BP Location: Left arm, Patient Position: Sitting, BP Cuff Size: Adult)   Pulse 95   Temp 35.9 °C (96.6 °F) (Temporal)   Resp 16   Ht 1.803 m (5' 11\")   Wt 88.5 kg (195 lb)   SpO2 97%     Physical Exam  Vitals reviewed.   Constitutional:       Appearance: Normal appearance.   HENT:      Head: Normocephalic and atraumatic.      Right Ear: External ear normal.      Left Ear: External ear normal.      Nose: Nose normal.      Mouth/Throat:      Mouth: Mucous membranes are moist.   Eyes:      Conjunctiva/sclera: Conjunctivae normal.   Cardiovascular:      Rate and Rhythm: Normal rate.   Pulmonary:      Effort: Pulmonary effort is normal.   Skin:     General: Skin is warm and dry.      Capillary Refill: Capillary refill takes less than 2 seconds.      Comments: Well-healing curvilinear laceration and volar left pinky finger without dehiscence erythema or exudate   Neurological:      Mental Status: He is alert and oriented to person, place, and time.         Assessment/Plan:     Diagnosis and associated orders:     1. Encounter for removal of sutures           Comments/MDM:     Personally removed 15 sutures without " incident.  No bleeding.  Discussed wound care         Differential diagnosis, natural history, supportive care, and indications for immediate follow-up discussed.    Advised the patient to follow-up with the primary care physician for recheck, reevaluation, and consideration of further management.    Please note that this dictation was created using voice recognition software. I have made a reasonable attempt to correct obvious errors, but I expect that there are errors of grammar and possibly content that I did not discover before finalizing the note.    This note was electronically signed by Pino Narayan PA-C

## 2023-08-18 ENCOUNTER — HOSPITAL ENCOUNTER (OUTPATIENT)
Dept: RADIOLOGY | Facility: MEDICAL CENTER | Age: 23
End: 2023-08-18
Payer: COMMERCIAL

## 2023-08-18 ENCOUNTER — OFFICE VISIT (OUTPATIENT)
Dept: URGENT CARE | Facility: PHYSICIAN GROUP | Age: 23
End: 2023-08-18
Payer: COMMERCIAL

## 2023-08-18 VITALS
TEMPERATURE: 97.9 F | OXYGEN SATURATION: 97 % | BODY MASS INDEX: 26.6 KG/M2 | HEART RATE: 64 BPM | WEIGHT: 190 LBS | HEIGHT: 71 IN | DIASTOLIC BLOOD PRESSURE: 78 MMHG | RESPIRATION RATE: 16 BRPM | SYSTOLIC BLOOD PRESSURE: 110 MMHG

## 2023-08-18 DIAGNOSIS — S49.91XA INJURY OF RIGHT SHOULDER, INITIAL ENCOUNTER: ICD-10-CM

## 2023-08-18 DIAGNOSIS — S43.401A SPRAIN OF RIGHT SHOULDER, UNSPECIFIED SHOULDER SPRAIN TYPE, INITIAL ENCOUNTER: ICD-10-CM

## 2023-08-18 PROCEDURE — 99213 OFFICE O/P EST LOW 20 MIN: CPT

## 2023-08-18 PROCEDURE — 3074F SYST BP LT 130 MM HG: CPT

## 2023-08-18 PROCEDURE — 73030 X-RAY EXAM OF SHOULDER: CPT | Mod: RT

## 2023-08-18 PROCEDURE — 3078F DIAST BP <80 MM HG: CPT

## 2023-08-18 RX ORDER — CYCLOBENZAPRINE HCL 5 MG
5-10 TABLET ORAL 3 TIMES DAILY PRN
Qty: 30 TABLET | Refills: 0 | Status: SHIPPED | OUTPATIENT
Start: 2023-08-18

## 2023-08-18 ASSESSMENT — FIBROSIS 4 INDEX: FIB4 SCORE: 0.5

## 2023-08-18 NOTE — PROGRESS NOTES
Subjective:   Real Porter Jr. is a 23 y.o. male who presents for Shoulder Injury (X2days. Pt thinks he dislocated his shoulder)      HPI:    Patient presents to urgent care with concerns of right shoulder injury sustained 2 days ago while playing basketball.   States he raised his arm in front of himself to the shoulder level to block another player and the other player fell into him. This pushed his arm into his shoulder and he now has pain in the posterior shoulder.  Patient reports painful ROM but is able to perform ROM.   Denies weakness, numbness/tingling sensation.   Rates pain as 3/10. He has icy hot patch in place.   Has not taken anything for pain at this time otherwise.      ROS As above in HPI    Medications:    No current outpatient medications on file prior to visit.     No current facility-administered medications on file prior to visit.        Allergies:   Patient has no known allergies.    Problem List:   Patient Active Problem List   Diagnosis    Knee pain    Sprain of cruciate ligament of knee    Preventative health care        Surgical History:  Past Surgical History:   Procedure Laterality Date    RECONSTRUCTION, KNEE, ACL, ARTHROSCOPIC  5/22/2014    Performed by Marlon Gaytan M.D. at [facility]    MENISCECTOMY  5/22/2014    Performed by Marlon Gaytan M.D. at [facility]    SYNOVECTOMY  5/22/2014    Performed by Marlon Gaytan M.D. at [facility]    RECONSTRUCTION, KNEE, ACL, ARTHROSCOPIC  11/6/2013    Performed by Marlon Gaytan M.D. at [facility]    MENISCUS REPAIR  11/6/2013    Performed by Marlon Gaytna M.D. at [facility]       Past Social Hx:   Social History     Tobacco Use    Smoking status: Never    Smokeless tobacco: Never   Vaping Use    Vaping Use: Never used   Substance Use Topics    Alcohol use: No    Drug use: No          Problem list, medications, and allergies reviewed by myself today in Epic.     Objective:  "    /78   Pulse 64   Temp 36.6 °C (97.9 °F) (Temporal)   Resp 16   Ht 1.803 m (5' 11\")   Wt 86.2 kg (190 lb)   SpO2 97%   BMI 26.50 kg/m²     Physical Exam  Vitals and nursing note reviewed.   Constitutional:       Appearance: Normal appearance.   Cardiovascular:      Rate and Rhythm: Normal rate and regular rhythm.      Heart sounds: Normal heart sounds.   Pulmonary:      Effort: Pulmonary effort is normal.      Breath sounds: Normal breath sounds.   Abdominal:      General: Bowel sounds are normal.      Palpations: Abdomen is soft.   Musculoskeletal:         General: Tenderness and signs of injury present. No swelling or deformity.      Comments: Right posterior shoulder is tender to palpation. No deformity, dislocation. No tenderness to elbow, wrist. Strength is normal and symmetric. Range of motion is normal and symmetric. ROM is not painful. Sensation is intact to light and sharp touch, cap refill is less than 2 seconds, 2+ radial pulse. No edema, erythema, fluctuance, warmth, rash.   Skin:     Capillary Refill: Capillary refill takes less than 2 seconds.      Findings: No erythema.   Neurological:      Mental Status: He is alert and oriented to person, place, and time.         Assessment/Plan:     DX-SHOULDER 2+ RIGHT 08/18/2023    Narrative  8/18/2023 1:05 PM    HISTORY/REASON FOR EXAM:  Pain/Deformity Following Trauma      TECHNIQUE/EXAM DESCRIPTION AND NUMBER OF VIEWS:  3 views of the RIGHT shoulder.    COMPARISON: None    FINDINGS:    No acute fracture or dislocation.  No joint osteoarthritis.    Impression  1. No acute osseous abnormality.      Diagnosis and associated orders:   1. Injury of right shoulder, initial encounter  - DX-SHOULDER 2+ RIGHT; Future    2. Sprain of right shoulder, unspecified shoulder sprain type, initial encounter  - cyclobenzaprine (FLEXERIL) 5 mg tablet; Take 1-2 Tablets by mouth 3 times a day as needed for Muscle Spasms.  Dispense: 30 Tablet; Refill: 0      "   Comments/MDM:     Dislocation not appreciated. Range of motion is intact and painless.   Patient requesting imaging. Per radiology impression, no acute osseous abnormality.   Recommend: rest, application of ice packs, NSAIDs/Tylenol per package instructions.  Patient advised to refrain from driving and/or use of alcohol while taking Flexeril.  Return to  if no improvement in 10 -14 days  Follow up with PCP advised.       Please note that this dictation was created using voice recognition software. I have made a reasonable attempt to correct obvious errors, but I expect that there are errors of grammar and possibly content that I did not discover before finalizing the note.    This note was electronically signed by Rosalva Monet, CHENG, APRN, FNP-C

## 2024-03-14 ENCOUNTER — OFFICE VISIT (OUTPATIENT)
Dept: URGENT CARE | Facility: CLINIC | Age: 24
End: 2024-03-14
Payer: COMMERCIAL

## 2024-03-14 VITALS
DIASTOLIC BLOOD PRESSURE: 80 MMHG | BODY MASS INDEX: 27.3 KG/M2 | SYSTOLIC BLOOD PRESSURE: 124 MMHG | RESPIRATION RATE: 16 BRPM | OXYGEN SATURATION: 97 % | HEIGHT: 71 IN | HEART RATE: 58 BPM | TEMPERATURE: 97.8 F | WEIGHT: 195 LBS

## 2024-03-14 DIAGNOSIS — R00.1 BRADYCARDIA: ICD-10-CM

## 2024-03-14 DIAGNOSIS — I51.7 RIGHT VENTRICULAR HYPERTROPHY: ICD-10-CM

## 2024-03-14 DIAGNOSIS — R55 SYNCOPE, UNSPECIFIED SYNCOPE TYPE: ICD-10-CM

## 2024-03-14 PROCEDURE — 93000 ELECTROCARDIOGRAM COMPLETE: CPT | Performed by: PHYSICIAN ASSISTANT

## 2024-03-14 PROCEDURE — 3079F DIAST BP 80-89 MM HG: CPT | Performed by: PHYSICIAN ASSISTANT

## 2024-03-14 PROCEDURE — 3074F SYST BP LT 130 MM HG: CPT | Performed by: PHYSICIAN ASSISTANT

## 2024-03-14 PROCEDURE — 99214 OFFICE O/P EST MOD 30 MIN: CPT | Performed by: PHYSICIAN ASSISTANT

## 2024-03-14 ASSESSMENT — ENCOUNTER SYMPTOMS
CHILLS: 0
VOMITING: 0
FEVER: 0
ABDOMINAL PAIN: 0
SINUS PAIN: 0
CONSTIPATION: 0
DIARRHEA: 0
COUGH: 0
EYE PAIN: 0
SHORTNESS OF BREATH: 0
DIZZINESS: 0
SORE THROAT: 0
HEADACHES: 0
NAUSEA: 0
EYE DISCHARGE: 0
WHEEZING: 0
DIAPHORESIS: 0
EYE REDNESS: 0

## 2024-03-14 ASSESSMENT — FIBROSIS 4 INDEX: FIB4 SCORE: 0.52

## 2024-03-14 NOTE — PROGRESS NOTES
"  Subjective:     Real Porter Jr.  is a 24 y.o. male who presents for Faint (Sx w/ minor right chest pain x yesterday )       He presents after suffering a fainting episode that occurred last night.  He states that shortness has been giving him smoking marijuana but he began feeling warmness all over his body.  He states that he could feel a fainting episode coming on and the next thing he knew he did wake up from the fainting episode.  His friend did witness the episode.  Has not had any similar previous episodes.  Denies any lingering chest pain or shortness of breath.  He did not lose control of bowel or bladder during the episode.  At this time he is feeling at his normal baseline.  He does note that he is quite active with cardiovascular endurance and exercise.  Has never experienced a presyncope/ectopy episode while exercising       Review of Systems   Constitutional:  Negative for chills, diaphoresis, fever and malaise/fatigue.        Fainted last night   HENT:  Negative for congestion, ear discharge, sinus pain and sore throat.    Eyes:  Negative for pain, discharge and redness.   Respiratory:  Negative for cough, shortness of breath and wheezing.    Cardiovascular:  Negative for chest pain.   Gastrointestinal:  Negative for abdominal pain, constipation, diarrhea, nausea and vomiting.   Neurological:  Negative for dizziness and headaches.      No Known Allergies  History reviewed. No pertinent past medical history.     Objective:   /80 (BP Location: Left arm, Patient Position: Sitting, BP Cuff Size: Adult)   Pulse (!) 58   Temp 36.6 °C (97.8 °F) (Temporal)   Resp 16   Ht 1.803 m (5' 11\")   Wt 88.5 kg (195 lb)   SpO2 97%   BMI 27.20 kg/m²   Physical Exam  Vitals and nursing note reviewed.   Constitutional:       General: He is not in acute distress.     Appearance: He is not ill-appearing or toxic-appearing.   HENT:      Head: Normocephalic.      Nose: No rhinorrhea.   Eyes:      " General: No scleral icterus.     Conjunctiva/sclera: Conjunctivae normal.   Cardiovascular:      Rate and Rhythm: Regular rhythm. Bradycardia present.   Pulmonary:      Effort: Pulmonary effort is normal. No respiratory distress.      Breath sounds: No stridor.   Musculoskeletal:      Cervical back: Neck supple.   Neurological:      Mental Status: He is alert and oriented to person, place, and time.   Psychiatric:         Mood and Affect: Mood normal.         Behavior: Behavior normal.         Thought Content: Thought content normal.         Judgment: Judgment normal.             Diagnostic testing:    EKG  12- Lead EKG; interpreted by ED Physician   Normal sinus rhythm with a rate of 48 bpm.   Normal axis.  Normal intervals.   No ST elevation or depression    No widening of QRS complex   Good R wave progression   No diagnostic Q waves.   No previous EKG for comparison.    Clinical Impression: Sinus bradycardia, suspected right ventricular hypertrophy      Assessment/Plan:     Encounter Diagnoses   Name Primary?    Syncope, unspecified syncope type     Right ventricular hypertrophy     Bradycardia           Plan for care for today's complaint includes obtaining EKG today.  EKG did show bradycardia with a heart rate of 48 with probable right-sided ventricular hypertrophy.  I discussed with the patient that the EKG changes could be correlated as physiologic response to his high level of cardiovascular endurance exercise due to increased cardiac efficiency and more efficient cardiac output.  However we also discussed that his bradycardia and ventricular hypertrophy could be a possible cause to his fainting episode that occurred last night and I will refer him to cardiology for further evaluation and management to rule out cardiac cause or cardiac pathology.  Based on his symptom presentation of becoming warm, being aware of the onset of the fainting episode does provide evidence of a possible vasovagal syncopal episode  at this time.  Symptoms do not appear to be related to a seizure at this time due to lack of bowel or bladder function loss and lack of postictal phase.  Discussed with the patient to closely monitor his symptoms and return to urgent care follow-up emergency department if they worsen or become severe..    See AVS Instructions below for written guidance provided to patient on after-visit management and care in addition to our verbal discussion during the visit.    Please note that this dictation was created using voice recognition software. I have attempted to correct all errors, but there may be sound-alike, spelling, grammar and possibly content errors that I did not discover before finalizing the note.    [place] J[place] Balderrama PA-C

## 2024-05-16 ENCOUNTER — OFFICE VISIT (OUTPATIENT)
Dept: CARDIOLOGY | Facility: MEDICAL CENTER | Age: 24
End: 2024-05-16
Attending: PHYSICIAN ASSISTANT
Payer: COMMERCIAL

## 2024-05-16 VITALS
OXYGEN SATURATION: 96 % | RESPIRATION RATE: 14 BRPM | BODY MASS INDEX: 26.01 KG/M2 | HEART RATE: 56 BPM | HEIGHT: 71 IN | DIASTOLIC BLOOD PRESSURE: 76 MMHG | SYSTOLIC BLOOD PRESSURE: 122 MMHG | WEIGHT: 185.8 LBS

## 2024-05-16 DIAGNOSIS — R00.1 BRADYCARDIA: ICD-10-CM

## 2024-05-16 DIAGNOSIS — R55 SYNCOPE AND COLLAPSE: ICD-10-CM

## 2024-05-16 PROCEDURE — 99203 OFFICE O/P NEW LOW 30 MIN: CPT | Performed by: INTERNAL MEDICINE

## 2024-05-16 PROCEDURE — 3078F DIAST BP <80 MM HG: CPT | Performed by: INTERNAL MEDICINE

## 2024-05-16 PROCEDURE — 3074F SYST BP LT 130 MM HG: CPT | Performed by: INTERNAL MEDICINE

## 2024-05-16 ASSESSMENT — FIBROSIS 4 INDEX: FIB4 SCORE: 0.52

## 2024-05-17 PROBLEM — R55 SYNCOPE AND COLLAPSE: Status: ACTIVE | Noted: 2024-05-17

## 2024-05-17 NOTE — PROGRESS NOTES
[facility]    PatientName:Real Porter Jr.Date: 2024  :2000    24 y.o.PCP:Janet Koehler P.A.-C.  MRN:[id number]          Problems and Plans    Syncope and collapse  Clinically, he is doing excellent not had any further syncopal episodes.  At this time the etiology of his syncopal event approximately 2 months ago is likely multifactorial however I suspect that there is a strong component of vasovagal syncope given that he had been smoking marijuana through a water pipe and ultimately may have been having increased intra-abdominal pressures restricting blood flow back to the heart causing a subsequent vagal tonic discharge and may have had noted bradycardia during the episode with decreased cerebral perfusion.  We will check an echocardiogram to assess for structural abnormalities and assess overall LV architecture.  Patient is amenable to his current course of care    Return if symptoms worsen or fail to improve.      Encounter    Reason for Visit / Chief Complaint: Syncopal episode    HPI    24-year-old male without significant past medical history had a syncopal episode.  He was referred for cardiology assessment.    Patient was in his usual state of health until approximately 2 months ago when he suffered a syncopal episode.  This was a witnessed syncopal episode when he was playing video games with his friend in the evening hour.  They were utilizing marijuana but had not utilized any type of tobacco products or alcohol.  He did not have any prodromal symptoms but felt a little warm when he was having the event.  He notes being largely unconscious for approximately 10 seconds and then when he awoke he was aware of his overall surroundings.  No myoclonic movements were witnessed.  No loss of bowel or bladder.  No recent changes in his medications or supplements.      Past Medical History  History reviewed. No pertinent past medical  history.  Past Surgical History  Past Surgical History:   Procedure Laterality Date    RECONSTRUCTION, KNEE, ACL, ARTHROSCOPIC  5/22/2014    Performed by Marlon Gaytan M.D. at SURGERY [facility]    MENISCECTOMY  5/22/2014    Performed by Marlon Gaytan M.D. at SURGERY [facility]    SYNOVECTOMY  5/22/2014    Performed by Marlon Gaytan M.D. at SURGERY [facility]    RECONSTRUCTION, KNEE, ACL, ARTHROSCOPIC  11/6/2013    Performed by Marlon Gaytan M.D. at SUR[facility]ERY [facility]    MENISCUS REPAIR  11/6/2013    Performed by Marlon Gaytan M.D. at SURGERY [facility]     Social History  Social History     Socioeconomic History    Marital status: Single     Spouse name: Not on file    Number of children: Not on file    Years of education: Not on file    Highest education level: Not on file   Occupational History    Not on file   Tobacco Use    Smoking status: Never    Smokeless tobacco: Never   Vaping Use    Vaping status: Never Used   Substance and Sexual Activity    Alcohol use: No    Drug use: No    Sexual activity: Not on file   Other Topics Concern    Not on file   Social History Narrative    Drive fork lift at work.         Exercises daily for about 60mins.     Well balanced diet.      Social Determinants of Health     Financial Resource Strain: Not on file   Food Insecurity: Not on file   Transportation Needs: Not on file   Physical Activity: Not on file   Stress: Not on file   Social Connections: Not on file   Intimate Partner Violence: Not on file   Housing Stability: Not on file     Past Family History  Family History   Problem Relation Age of Onset    Diabetes Other     Hypertension Other      Medication(s)  No current outpatient medications on file.  Allergies  Patient has no known allergies.    Review of Systems    A comprehensive 10 system review was conducted and is negative except as noted above in the HPI or here.      Vital Signs  /76 (BP Location: Left arm, Patient Position:  "Sitting, BP Cuff Size: Adult)   Pulse (!) 56   Resp 14   Ht 1.803 m (5' 11\")   Wt 84.3 kg (185 lb 12.8 oz)   SpO2 96%   BMI 25.91 kg/m²     Physical Exam  Constitutional:       Appearance: Normal appearance. He is obese.   HENT:      Head: Normocephalic and atraumatic.      Mouth/Throat:      Mouth: Mucous membranes are moist.      Pharynx: Oropharynx is clear.   Eyes:      Extraocular Movements: Extraocular movements intact.      Conjunctiva/sclera: Conjunctivae normal.   Cardiovascular:      Rate and Rhythm: Normal rate and regular rhythm.      Pulses: Normal pulses.      Heart sounds: Normal heart sounds. No murmur heard.     No friction rub. No gallop.   Pulmonary:      Effort: Pulmonary effort is normal.      Breath sounds: Normal breath sounds. No wheezing, rhonchi or rales.   Abdominal:      General: Bowel sounds are normal. There is no distension.      Palpations: Abdomen is soft.   Musculoskeletal:         General: Normal range of motion.      Cervical back: Normal range of motion and neck supple.      Right lower leg: No edema.      Left lower leg: No edema.   Skin:     General: Skin is warm and dry.   Neurological:      General: No focal deficit present.      Mental Status: He is alert and oriented to person, place, and time. Mental status is at baseline.   Psychiatric:         Mood and Affect: Mood normal.         Behavior: Behavior normal.         Thought Content: Thought content normal.         Judgment: Judgment normal.         Lab Results   Component Value Date/Time    TSHULTRASEN 1.350 09/09/2022 1028      No results found for: \"FREET4\"     Lab Results   Component Value Date/Time    HBA1C 5.7 (H) 09/09/2022 10:28 AM       Lab Results   Component Value Date/Time    CHOLSTRLTOT 168 09/09/2022 10:28 AM    LDL 99 09/09/2022 10:28 AM    HDL 51 09/09/2022 10:28 AM    TRIGLYCERIDE 91 09/09/2022 10:28 AM         Lab Results   Component Value Date/Time    SODIUM 140 09/09/2022 10:28 AM    POTASSIUM 3.8 " 09/09/2022 10:28 AM    CHLORIDE 105 09/09/2022 10:28 AM    CO2 25 09/09/2022 10:28 AM    GLUCOSE 108 (H) 09/09/2022 10:28 AM    BUN 21 09/09/2022 10:28 AM    CREATININE 0.98 09/09/2022 10:28 AM    BUNCREATRAT 20 09/24/2020 05:16 AM       Lab Results   Component Value Date/Time    ALKPHOSPHAT 90 09/09/2022 10:28 AM    ASTSGOT 16 09/09/2022 10:28 AM    ALTSGPT 17 09/09/2022 10:28 AM    TBILIRUBIN 0.3 09/09/2022 10:28 AM         Echocardiogram  Pending      Total patient time was estimated to be 30 minutes consisting of chart review, direct patient interaction, medication renewal, plan development and overall communication with the cardiovascular team.        Electronically signed by:   James Freeman DO, MPH  [facility]    Portions of this note were completed using voice recognition software (Dragon Naturally speaking software) . Occasional transcription errors may have escaped proof reading. I have made every reasonable attempt to correct obvious errors, but I expect that there are errors of grammar and possibly content that I did not discover before finalizing the note.

## 2024-05-18 NOTE — ASSESSMENT & PLAN NOTE
Clinically, he is doing excellent not had any further syncopal episodes.  At this time the etiology of his syncopal event approximately 2 months ago is likely multifactorial however I suspect that there is a strong component of vasovagal syncope given that he had been smoking marijuana through a water pipe and ultimately may have been having increased intra-abdominal pressures restricting blood flow back to the heart causing a subsequent vagal tonic discharge and may have had noted bradycardia during the episode with decreased cerebral perfusion.  We will check an echocardiogram to assess for structural abnormalities and assess overall LV architecture.  Patient is amenable to his current course of care

## 2024-06-11 ENCOUNTER — APPOINTMENT (OUTPATIENT)
Dept: CARDIOLOGY | Facility: MEDICAL CENTER | Age: 24
End: 2024-06-11
Attending: INTERNAL MEDICINE
Payer: COMMERCIAL